# Patient Record
Sex: FEMALE | Race: WHITE | NOT HISPANIC OR LATINO | Employment: OTHER | URBAN - METROPOLITAN AREA
[De-identification: names, ages, dates, MRNs, and addresses within clinical notes are randomized per-mention and may not be internally consistent; named-entity substitution may affect disease eponyms.]

---

## 2020-11-12 PROBLEM — F32.A DEPRESSION: Status: ACTIVE | Noted: 2019-01-16

## 2020-11-12 PROBLEM — M19.90 ARTHRITIS: Status: ACTIVE | Noted: 2020-11-12

## 2020-11-12 PROBLEM — E55.9 VITAMIN D DEFICIENCY: Status: ACTIVE | Noted: 2019-01-16

## 2020-11-12 PROBLEM — I65.23 BILATERAL CAROTID ARTERY STENOSIS: Status: ACTIVE | Noted: 2019-01-21

## 2020-11-12 PROBLEM — M47.819 UNDIFFERENTIATED SPONDYLOARTHROPATHY: Status: ACTIVE | Noted: 2019-02-13

## 2022-10-06 PROBLEM — E11.43 GASTROPARESIS DIABETICORUM: Status: ACTIVE | Noted: 2022-02-22

## 2022-10-06 PROBLEM — K31.84 GASTROPARESIS DIABETICORUM: Status: ACTIVE | Noted: 2022-02-22

## 2023-01-17 ENCOUNTER — PATIENT OUTREACH (OUTPATIENT)
Dept: ADMINISTRATIVE | Facility: HOSPITAL | Age: 53
End: 2023-01-17

## 2023-01-17 NOTE — PROGRESS NOTES
[facility] CMS chart audits/COLON CANCER SCREENING. Chart review completed for HM test overdue (mammograms, Colonoscopies, pap smears, DM labs, and/or EYE EXAMs)      Care Everywhere and media, updates requested and reviewed.    No documentation of colon cancer screening found.

## 2023-02-27 PROBLEM — Z95.1 HX OF CABG: Status: ACTIVE | Noted: 2023-02-27

## 2023-10-02 ENCOUNTER — PATIENT OUTREACH (OUTPATIENT)
Dept: ADMINISTRATIVE | Facility: HOSPITAL | Age: 53
End: 2023-10-02

## 2024-04-08 ENCOUNTER — PATIENT OUTREACH (OUTPATIENT)
Dept: ADMINISTRATIVE | Facility: HOSPITAL | Age: 54
End: 2024-04-08

## 2024-04-08 NOTE — PROGRESS NOTES
Non-compliant report chart audits for CMS[facility], COLON CANCER SCREENING/BREAST CANCER SCREENING. Chart review completed for HM test overdue (mammograms, Colonoscopies, pap smears, DM labs, and/or EYE EXAMs)      Care Everywhere and media, updates requested and reviewed.      No documentation of colon cancer screening.  No documentation of breast cancer screening.

## 2024-05-21 PROBLEM — Z00.00 MEDICARE ANNUAL WELLNESS VISIT, SUBSEQUENT: Status: ACTIVE | Noted: 2024-05-21
